# Patient Record
(demographics unavailable — no encounter records)

---

## 2024-10-27 NOTE — HISTORY OF PRESENT ILLNESS
[de-identified] : 10/23/2024: The patient is a 14 year old F, right hand dominant who presents today complaining of right knee pain. Date of Injury/Onset: 10/2/2024 Pain:    At Rest: 6/10 With Activity:  9/10 Mechanism of injury: patient was playing in a soccer game, when the opponent hit/pushed into her side leading patient to turn the right knee inward and fall. also landing on her left wrist This is not a Work Related Injury being treated under Worker's Compensation. This is an athletic injury occurring associated with an interscholastic or organized sports team. Quality of symptoms: throbbing medial knee pain, constant  Improves with: res, ice, NSAIDs Worse with: EB, walking, full knee extension, knee flexion  Prior treatment: [facility] 10/20/2024 - XR, MRI Prior imaging: XR 10/20/2024, MRI 10/21/2024 Previous injury: None Out of work/sport: Yes, since date of injury School/Sport/Position/Occupation: [employer], 9th - soccer, dance  Additional Information: None

## 2024-10-27 NOTE — HISTORY OF PRESENT ILLNESS
[de-identified] : 10/23/2024: The patient is a 14 year old F, right hand dominant who presents today complaining of right knee pain. Date of Injury/Onset: 10/2/2024 Pain:    At Rest: 6/10 With Activity:  9/10 Mechanism of injury: patient was playing in a soccer game, when the opponent hit/pushed into her side leading patient to turn the right knee inward and fall. also landing on her left wrist This is not a Work Related Injury being treated under Worker's Compensation. This is an athletic injury occurring associated with an interscholastic or organized sports team. Quality of symptoms: throbbing medial knee pain, constant  Improves with: res, ice, NSAIDs Worse with: EB, walking, full knee extension, knee flexion  Prior treatment: [facility] 10/20/2024 - XR, MRI Prior imaging: XR 10/20/2024, MRI 10/21/2024 Previous injury: None Out of work/sport: Yes, since date of injury School/Sport/Position/Occupation: [employer], 9th - soccer, dance  Additional Information: None

## 2024-10-27 NOTE — IMAGING
[de-identified] : RIGHT KNEE Inspection: moderate effusion Palpation: med facet of patella tenderness, medial retinacular tenderness Knee Range of Motion: 5-120 Strength: 5/5 Quadriceps strength, 5/5 Hamstring strength Neurological: light touch is intact throughout Ligament Stability and Special Tests:  McMurrays: neg Lachman: neg Pivot Shift: neg Posterior Drawer: neg Valgus: neg Varus: neg Patella Apprehension: positive Patella Maltracking: positive  LEFT WRIST and HAND Inspection: No erythema, No swelling  Palpation: No Tenderness  Range of Motion: Full range of motion. Strength: normal Neurological testing: motor exam 5/5 and light touch intact.  Special Testing:

## 2024-10-27 NOTE — IMAGING
[de-identified] : RIGHT KNEE Inspection: moderate effusion Palpation: med facet of patella tenderness, medial retinacular tenderness Knee Range of Motion: 5-120 Strength: 5/5 Quadriceps strength, 5/5 Hamstring strength Neurological: light touch is intact throughout Ligament Stability and Special Tests:  McMurrays: neg Lachman: neg Pivot Shift: neg Posterior Drawer: neg Valgus: neg Varus: neg Patella Apprehension: positive Patella Maltracking: positive  LEFT WRIST and HAND Inspection: No erythema, No swelling  Palpation: No Tenderness  Range of Motion: Full range of motion. Strength: normal Neurological testing: motor exam 5/5 and light touch intact.  Special Testing:

## 2024-10-27 NOTE — IMAGING
[de-identified] : RIGHT KNEE Inspection: moderate effusion Palpation: med facet of patella tenderness, medial retinacular tenderness Knee Range of Motion: 5-120 Strength: 5/5 Quadriceps strength, 5/5 Hamstring strength Neurological: light touch is intact throughout Ligament Stability and Special Tests:  McMurrays: neg Lachman: neg Pivot Shift: neg Posterior Drawer: neg Valgus: neg Varus: neg Patella Apprehension: positive Patella Maltracking: positive  LEFT WRIST and HAND Inspection: No erythema, No swelling  Palpation: No Tenderness  Range of Motion: Full range of motion. Strength: normal Neurological testing: motor exam 5/5 and light touch intact.  Special Testing:

## 2024-10-27 NOTE — HISTORY OF PRESENT ILLNESS
[de-identified] : 10/23/2024: The patient is a 14 year old F, right hand dominant who presents today complaining of right knee pain. Date of Injury/Onset: 10/2/2024 Pain:    At Rest: 6/10 With Activity:  9/10 Mechanism of injury: patient was playing in a soccer game, when the opponent hit/pushed into her side leading patient to turn the right knee inward and fall. also landing on her left wrist This is not a Work Related Injury being treated under Worker's Compensation. This is an athletic injury occurring associated with an interscholastic or organized sports team. Quality of symptoms: throbbing medial knee pain, constant  Improves with: res, ice, NSAIDs Worse with: EB, walking, full knee extension, knee flexion  Prior treatment: [facility] 10/20/2024 - XR, MRI Prior imaging: XR 10/20/2024, MRI 10/21/2024 Previous injury: None Out of work/sport: Yes, since date of injury School/Sport/Position/Occupation: [employer], 9th - soccer, dance  Additional Information: None

## 2024-10-27 NOTE — DISCUSSION/SUMMARY
[de-identified] : Assessment: The patient is a 14 year old female with physical exam findings consistent with [ RIGHT knee patella instability, and osteochondral defect and Wrist Sprain].   Patient, Mom, and I discussed their symptoms. Discussed findings of today's exam and possible causes of patient's pain. Educated patient on their most probable diagnosis. Reviewed possible courses of treatment, and we collaboratively decided best course of treatment at this time will include:   Right Knee: 1. Discussed dx and treatment options at length. Discussed surgical and non-surgical treatments, r/b/a, recovery and outcomes to each were discussed. Patient wants to move forward with surgical treatment at this time.   2. Icing and OTC anti-inflammatories as needed. 3. Patient out of sports/gym. 4. Advised patient to wear brace on knee.  Right Wrist: 1. Patient expressed pain in wrist, mom concerned due to needing crutches after surgery. 2. Icing and OTC anti-inflammatories as needed.     Follow up in [ sx for knee and as needed for wrist ]  Instructions: Dx / Natural History The patient was advised of the diagnosis.  The natural history of the pathology was explained in full to the patient in layman's terms.  Several different treatment options were discussed and explained in full to the patient including the risks and benefits of both surgical and non-surgical treatments.  All questions and concerns were answered.   RICE I explained to the patient that rest, ice, compression, and elevation would benefit them.  They may return to activity after follow-up or when they no longer have any pain.   NSAIDs - OTC Patient is to begin over the counter oral anti-inflammatory medications on an as needed basis, as long as there are no medical contraindications.  Patient is counseled on possible GI and blood pressure side effects.   Pain Guide Activities The patient was advised to let pain guide the gradual advancement of activities.   Icing The patient was advised to apply ice (wrapped in a towel or protective covering) to the area daily (20 minutes at a time, 2-4X/day).   All of the patient's questions were answered to Her satisfaction. Diagnoses and potential treatments were reviewed. She agreed with the plan and would like to move forward with it.  Consent:  Conservative treatment, nontreatment, nonsurgical intervention and surgical intervention treatment options have been reviewed with the patient.  The patient continues to be symptomatic and has failed conservative treatment, and elects to move forward with surgical intervention.  The patient is indicated for [the above procedure] and all indicated procedures. As such the alternatives, benefits and risks, of the above procedure, including but not limited to bleeding, infection, neurovascular injury, loss of limb, loss of life,  DVT, PE, RSD, inability to return to previous level of activity, inability to return to previous level of employment, advancement of or to osteoarthritic changes, joint instability or motion loss, hardware failure or migration, malunion or nonunion, failure to resolve all symptoms, failure to return to sports and need for further procedures were discussed with the patient and/or their legal guardian who agreed to move forward with surgical intervention.  They have reviewed and signed the consent form today after expressing understanding of the above documented conversation. The patient or their representative will contact my office as instructed on the preoperative instruction sheet they received today to schedule surgery in a timely manner as discussed.

## 2024-10-27 NOTE — DISCUSSION/SUMMARY
[de-identified] : Assessment: The patient is a 14 year old female with physical exam findings consistent with [ RIGHT knee patella instability, and osteochondral defect and Wrist Sprain].   Patient, Mom, and I discussed their symptoms. Discussed findings of today's exam and possible causes of patient's pain. Educated patient on their most probable diagnosis. Reviewed possible courses of treatment, and we collaboratively decided best course of treatment at this time will include:   Right Knee: 1. Discussed dx and treatment options at length. Discussed surgical and non-surgical treatments, r/b/a, recovery and outcomes to each were discussed. Patient wants to move forward with surgical treatment at this time.   2. Icing and OTC anti-inflammatories as needed. 3. Patient out of sports/gym. 4. Advised patient to wear brace on knee.  Right Wrist: 1. Patient expressed pain in wrist, mom concerned due to needing crutches after surgery. 2. Icing and OTC anti-inflammatories as needed.     Follow up in [ sx for knee and as needed for wrist ]  Instructions: Dx / Natural History The patient was advised of the diagnosis.  The natural history of the pathology was explained in full to the patient in layman's terms.  Several different treatment options were discussed and explained in full to the patient including the risks and benefits of both surgical and non-surgical treatments.  All questions and concerns were answered.   RICE I explained to the patient that rest, ice, compression, and elevation would benefit them.  They may return to activity after follow-up or when they no longer have any pain.   NSAIDs - OTC Patient is to begin over the counter oral anti-inflammatory medications on an as needed basis, as long as there are no medical contraindications.  Patient is counseled on possible GI and blood pressure side effects.   Pain Guide Activities The patient was advised to let pain guide the gradual advancement of activities.   Icing The patient was advised to apply ice (wrapped in a towel or protective covering) to the area daily (20 minutes at a time, 2-4X/day).   All of the patient's questions were answered to Her satisfaction. Diagnoses and potential treatments were reviewed. She agreed with the plan and would like to move forward with it.  Consent:  Conservative treatment, nontreatment, nonsurgical intervention and surgical intervention treatment options have been reviewed with the patient.  The patient continues to be symptomatic and has failed conservative treatment, and elects to move forward with surgical intervention.  The patient is indicated for [the above procedure] and all indicated procedures. As such the alternatives, benefits and risks, of the above procedure, including but not limited to bleeding, infection, neurovascular injury, loss of limb, loss of life,  DVT, PE, RSD, inability to return to previous level of activity, inability to return to previous level of employment, advancement of or to osteoarthritic changes, joint instability or motion loss, hardware failure or migration, malunion or nonunion, failure to resolve all symptoms, failure to return to sports and need for further procedures were discussed with the patient and/or their legal guardian who agreed to move forward with surgical intervention.  They have reviewed and signed the consent form today after expressing understanding of the above documented conversation. The patient or their representative will contact my office as instructed on the preoperative instruction sheet they received today to schedule surgery in a timely manner as discussed.

## 2024-11-18 NOTE — IMAGING
[de-identified] : RIGHT KNEE   Inspection: mild effusion, incisions c/d/i Palpation: medial facet ttp Knee Range of Motion: 0-120 Strength: 4/5 Quadriceps strength, 5/5 Hamstring strength Neurological: light touch is intact throughout Ligament Stability and Special Tests: McMurrays: neg Lachman: neg Pivot Shift: neg Posterior Drawer: neg Valgus: neg Varus: neg Patella Apprehension: neg Patella Maltracking: neg

## 2024-11-18 NOTE — DISCUSSION/SUMMARY
[de-identified] : The patient is approximately 2 weeks postoperative. s/p right knee ORIF femoral condyle osteochondral fragment, MPFL reconstruction w/ semitendinosus allograft, lateral release, VMO advancement, and medial capsular repair DOS: 11/5/2024  Sutures removed and Steri Strips applied today. The patient is instructed in wound management. The patient's post-op plan, protocol and activity modifications have been thoroughly discussed and the patient expressed understanding. The patient will control pain as discussed & continue ice and elevation as needed. The patient otherwise may advance activity as discussed.   Arthroscopy photos will be reviewed at next visit.     Prescription Medications Ordered: [None]   Physical Therapy: [Start per protocol, new prescription given today, continue home exercise program]   Braces/DME Ordered: [Continue Postop Brace]   Activity/Work/Sports Status: [Out of work/gym/sports]   Follow-Up: [4 weeks]

## 2024-11-18 NOTE — HISTORY OF PRESENT ILLNESS
[de-identified] : 11/18/2024 : patient present today for POV #1 s/p right knee ORIF femoral condyle osteochondral fragment, MPFL reconstruction w/ semitendinosus allograft, lateral release, VMO advancement, and medial capsular repair DOS: 11/5/2024 Pain:     At Rest: 1/10 With Activity: 7/10 Quality Of Symptoms: aching pain general knee Since last visit: pt has been compliant w/ knee immobilizer and has been NWB w/ crutches  10/23/2024: The patient is a 14 year old F, right hand dominant who presents today complaining of right knee pain. Date of Injury/Onset: 10/2/2024 Pain:    At Rest: 6/10 With Activity:  9/10 Mechanism of injury: patient was playing in a soccer game, when the opponent hit/pushed into her side leading patient to turn the right knee inward and fall. also landing on her left wrist This is not a Work Related Injury being treated under Worker's Compensation. This is an athletic injury occurring associated with an interscholastic or organized sports team. Quality of symptoms: throbbing medial knee pain, constant  Improves with: res, ice, NSAIDs Worse with: EB, walking, full knee extension, knee flexion  Prior treatment: [facility] 10/20/2024 - XR, MRI Prior imaging: XR 10/20/2024, MRI 10/21/2024 Previous injury: None Out of work/sport: Yes, since date of injury School/Sport/Position/Occupation: [employer], 9th - soccer, dance  Additional Information: None
Bed/Stretcher in lowest position, wheels locked, appropriate side rails in place/Call bell, personal items and telephone in reach/Instruct patient to call for assistance before getting out of bed/chair/stretcher/Non-slip footwear applied when patient is off stretcher/[place] to call system/Physically safe environment - no spills, clutter or unnecessary equipment/Purposeful proactive rounding/Room/bathroom lighting operational, light cord in reach

## 2024-11-18 NOTE — REASON FOR VISIT
[Parent] : parent [FreeTextEntry2] : POV#1 s/p right knee ORIF femoral condyle osteochondral fragment, MPFL reconstruction w/ semitendinosus allograft, lateral release, VMO advancement, and medial capsular repair DOS: 11/5/2024

## 2024-12-11 NOTE — IMAGING
[de-identified] : RIGHT KNEE Inspection: mild effusion, incisions are dry, small scab at midportion of incision, no active drainage and no sign of infection Palpation: medial facet ttp Knee Range of Motion: 0-60 Strength: 4/5 Quadriceps strength, 5/5 Hamstring strength Neurological: light touch is intact throughout Ligament Stability and Special Tests: McMurrays: neg Lachman: neg Pivot Shift: neg Posterior Drawer: neg Valgus: neg Varus: neg Patella Apprehension: neg Patella Maltracking: neg

## 2024-12-11 NOTE — REASON FOR VISIT
[Parent] : parent [FreeTextEntry2] : POV#2 s/p right knee ORIF femoral condyle osteochondral fragment, MPFL reconstruction w/ semitendinosus allograft, lateral release, VMO advancement, and medial capsular repair DOS: 11/5/2024

## 2024-12-11 NOTE — DISCUSSION/SUMMARY
[de-identified] : PT in post op protocol - s/p right knee ORIF femoral condyle osteochondral fragment, MPFL reconstruction w/ semitendinosus allograft, lateral release, VMO advancement, and medial capsular repair DOS: 11/5/2024   - Continue Physical Therapy - Home exercises program learned at physical therapy. - Finish ABX - The patient was advised to apply ice (wrapped in a towel or protective covering) to the area daily (20 minutes at a time, 2-4X/day).    Follow up: on 12/23

## 2024-12-11 NOTE — HISTORY OF PRESENT ILLNESS
[de-identified] : 12/11/2024: 12/11/2024 : patient present today for POV #2 s/p right knee ORIF femoral condyle osteochondral fragment, MPFL reconstruction w/ semitendinosus allograft, lateral release, VMO advancement, and medial capsular repair DOS: 11/05/2024 Pain:     At Rest: 1/10 With Activity: 4/10 Quality Of Symptoms: aching pain  Since last visit: pt has been compliant w/ knee immobilizer and has been NWB w/ crutches.  Pt is in PT 2x/week.  Not taking anything for pain.  Occasional mild itchiness at incision site.  Taking antibiotics for one week.    11/18/2024 : patient present today for POV #1 s/p right knee ORIF femoral condyle osteochondral fragment, MPFL reconstruction w/ semitendinosus allograft, lateral release, VMO advancement, and medial capsular repair DOS: 11/5/2024 Pain:     At Rest: 1/10 With Activity: 7/10 Quality Of Symptoms: aching pain general knee Since last visit: pt has been compliant w/ knee immobilizer and has been NWB w/ crutches  10/23/2024: The patient is a 14 year old F, right hand dominant who presents today complaining of right knee pain. Date of Injury/Onset: 10/2/2024 Pain:    At Rest: 6/10 With Activity:  9/10 Mechanism of injury: patient was playing in a soccer game, when the opponent hit/pushed into her side leading patient to turn the right knee inward and fall. also landing on her left wrist This is not a Work Related Injury being treated under Worker's Compensation. This is an athletic injury occurring associated with an interscholastic or organized sports team. Quality of symptoms: throbbing medial knee pain, constant  Improves with: res, ice, NSAIDs Worse with: EB, walking, full knee extension, knee flexion  Prior treatment: [facility] 10/20/2024 - XR, MRI Prior imaging: XR 10/20/2024, MRI 10/21/2024 Previous injury: None Out of work/sport: Yes, since date of injury School/Sport/Position/Occupation: [employer], 9th - soccer, dance  Additional Information: None

## 2024-12-23 NOTE — HISTORY OF PRESENT ILLNESS
[de-identified] : 12/23/2024 : patient present today for POV #3 s/p right knee ORIF femoral condyle osteochondral fragment, MPFL reconstruction w/ semitendinosus allograft, lateral release, VMO advancement, and medial capsular repair DOS: 11/5/2024 Pain:     At Rest: 0/10 With Activity: 3/10 Quality Of Symptoms: Soreness  Since last visit: Attending physical theray 2x a week at [facility] in [place].  12/11/2024: 12/11/2024 : patient present today for POV #2 s/p right knee ORIF femoral condyle osteochondral fragment, MPFL reconstruction w/ semitendinosus allograft, lateral release, VMO advancement, and medial capsular repair DOS: 11/05/2024 Pain:     At Rest: 1/10 With Activity: 4/10 Quality Of Symptoms: aching pain  Since last visit: pt has been compliant w/ knee immobilizer and has been NWB w/ crutches.  Pt is in PT 2x/week.  Not taking anything for pain.  Occasional mild itchiness at incision site.  Taking antibiotics for one week.    11/18/2024 : patient present today for POV #1 s/p right knee ORIF femoral condyle osteochondral fragment, MPFL reconstruction w/ semitendinosus allograft, lateral release, VMO advancement, and medial capsular repair DOS: 11/5/2024 Pain:     At Rest: 1/10 With Activity: 7/10 Quality Of Symptoms: aching pain general knee Since last visit: pt has been compliant w/ knee immobilizer and has been NWB w/ crutches  10/23/2024: The patient is a 14 year old F, right hand dominant who presents today complaining of right knee pain. Date of Injury/Onset: 10/2/2024 Pain:    At Rest: 6/10 With Activity:  9/10 Mechanism of injury: patient was playing in a soccer game, when the opponent hit/pushed into her side leading patient to turn the right knee inward and fall. also landing on her left wrist This is not a Work Related Injury being treated under Worker's Compensation. This is an athletic injury occurring associated with an interscholastic or organized sports team. Quality of symptoms: throbbing medial knee pain, constant  Improves with: res, ice, NSAIDs Worse with: EB, walking, full knee extension, knee flexion  Prior treatment: [facility] 10/20/2024 - XR, MRI Prior imaging: XR 10/20/2024, MRI 10/21/2024 Previous injury: None Out of work/sport: Yes, since date of injury School/Sport/Position/Occupation: [employer] , 9th - soccer, dance  Additional Information: None

## 2024-12-23 NOTE — REASON FOR VISIT
[Parent] : parent [FreeTextEntry2] : POV#3 s/p right knee ORIF femoral condyle osteochondral fragment, MPFL reconstruction w/ semitendinosus allograft, lateral release, VMO advancement, and medial capsular repair DOS: 11/5/2024

## 2024-12-23 NOTE — IMAGING
[de-identified] : RIGHT KNEE Inspection: mild effusion, incisions are dry, small scab at midportion of incision, no active drainage and no sign of infection Palpation: medial facet ttp Knee Range of Motion: 0-70 Strength: 4/5 Quadriceps strength, 5/5 Hamstring strength Neurological: light touch is intact throughout Ligament Stability and Special Tests: McMurrays: neg Lachman: neg Pivot Shift: neg Posterior Drawer: neg Valgus: neg Varus: neg Patella Apprehension: neg Patella Maltracking: neg

## 2024-12-23 NOTE — DISCUSSION/SUMMARY
[de-identified] : PT in post op protocol - s/p right knee ORIF femoral condyle osteochondral fragment, MPFL reconstruction w/ semitendinosus allograft, lateral release, VMO advancement, and medial capsular repair DOS: 11/5/2024   - The patient was provided with a prescription for Physical Therapy - Home exercises program learned at physical therapy. - The patient was advised to apply ice (wrapped in a towel or protective covering) to the area daily (20 minutes at a time, 2-4X/day). - Continue brace    Follow up: 6 weeks

## 2025-02-03 NOTE — DISCUSSION/SUMMARY
[de-identified] : PT in post op protocol - s/p right knee ORIF femoral condyle osteochondral fragment, MPFL reconstruction w/ semitendinosus allograft, lateral release, VMO advancement, and medial capsular repair DOS: 11/5/2024   - The patient was provided with a prescription for Physical Therapy - Home exercises program learned at physical therapy. - The patient was advised to apply ice (wrapped in a towel or protective covering) to the area daily (20 minutes at a time, 2-4X/day). - Discussed putting Vitamin E and cocoa butter on scar as needed - D/c brace. - Discussed compression sleeve or functional brace for when patient returns to sports.   Follow up: 3 months

## 2025-02-03 NOTE — HISTORY OF PRESENT ILLNESS
[de-identified] : 02/03/2025: Patient is 3 months s/p right knee ORIF femoral condyle osteochondral fragment, MPFL reconstruction w/ semitendinosus allograft, lateral release, VMO advancement, and medial capsular repair (DOS: 11/5/24). Pain and symptoms are better. Since last visit pt continues to attend physical therapy 2-3x/week with significant improvement.   12/23/2024 : patient present today for POV #3 s/p right knee ORIF femoral condyle osteochondral fragment, MPFL reconstruction w/ semitendinosus allograft, lateral release, VMO advancement, and medial capsular repair DOS: 11/5/2024 Pain:     At Rest: 0/10 With Activity: 3/10 Quality Of Symptoms: Soreness  Since last visit: Attending physical theray 2x a week at [facility] in [place].  12/11/2024: 12/11/2024 : patient present today for POV #2 s/p right knee ORIF femoral condyle osteochondral fragment, MPFL reconstruction w/ semitendinosus allograft, lateral release, VMO advancement, and medial capsular repair DOS: 11/05/2024 Pain:     At Rest: 1/10 With Activity: 4/10 Quality Of Symptoms: aching pain  Since last visit: pt has been compliant w/ knee immobilizer and has been NWB w/ crutches.  Pt is in PT 2x/week.  Not taking anything for pain.  Occasional mild itchiness at incision site.  Taking antibiotics for one week.    11/18/2024 : patient present today for POV #1 s/p right knee ORIF femoral condyle osteochondral fragment, MPFL reconstruction w/ semitendinosus allograft, lateral release, VMO advancement, and medial capsular repair DOS: 11/5/2024 Pain:     At Rest: 1/10 With Activity: 7/10 Quality Of Symptoms: aching pain general knee Since last visit: pt has been compliant w/ knee immobilizer and has been NWB w/ crutches  10/23/2024: The patient is a 14 year old F, right hand dominant who presents today complaining of right knee pain. Date of Injury/Onset: 10/2/2024 Pain:    At Rest: 6/10 With Activity:  9/10 Mechanism of injury: patient was playing in a soccer game, when the opponent hit/pushed into her side leading patient to turn the right knee inward and fall. also landing on her left wrist This is not a Work Related Injury being treated under Worker's Compensation. This is an athletic injury occurring associated with an interscholastic or organized sports team. Quality of symptoms: throbbing medial knee pain, constant  Improves with: res, ice, NSAIDs Worse with: EB, walking, full knee extension, knee flexion  Prior treatment: [facility] 10/20/2024 - XR, MRI Prior imaging: XR 10/20/2024, MRI 10/21/2024 Previous injury: None Out of work/sport: Yes, since date of injury School/Sport/Position/Occupation: [employer], 9th - soccer, dance  Additional Information: None

## 2025-02-03 NOTE — IMAGING
[de-identified] : RIGHT KNEE Inspection: mild effusion, incisions are well healed Palpation: medial facet ttp Knee Range of Motion: 0-120 Strength: 4/5 Quadriceps strength, 5/5 Hamstring strength Neurological: light touch is intact throughout Ligament Stability and Special Tests: McMurrays: neg Lachman: neg Pivot Shift: neg Posterior Drawer: neg Valgus: neg Varus: neg Patella Apprehension: neg Patella Maltracking: neg

## 2025-02-03 NOTE — REASON FOR VISIT
[Parent] : parent [FreeTextEntry2] :  s/p right knee ORIF femoral condyle osteochondral fragment, MPFL reconstruction w/ semitendinosus allograft, lateral release, VMO advancement, and medial capsular repair DOS: 11/5/2024

## 2025-05-01 NOTE — HISTORY OF PRESENT ILLNESS
[de-identified] : 04/28/2025: Pt here today for follow-up s/p right knee ORIF femoral condyle osteochondral fragment, MPFL reconstruction w/ semitendinosus allograft, lateral release, VMO advancement, and medial capsular repair (DOS: 11/5/24). Pain and symptoms are improving. Since last visit pt has continued PT, going 2x/wk @ [facility] [place].   02/03/2025: Patient is 3 months s/p right knee ORIF femoral condyle osteochondral fragment, MPFL reconstruction w/ semitendinosus allograft, lateral release, VMO advancement, and medial capsular repair (DOS: 11/5/24). Pain and symptoms are better. Since last visit pt continues to attend physical therapy 2-3x/week with significant improvement.   12/23/2024 : patient present today for POV #3 s/p right knee ORIF femoral condyle osteochondral fragment, MPFL reconstruction w/ semitendinosus allograft, lateral release, VMO advancement, and medial capsular repair DOS: 11/5/2024 Pain:     At Rest: 0/10 With Activity: 3/10 Quality Of Symptoms: Soreness  Since last visit: Attending physical theray 2x a week at [facility] in [place].  12/11/2024: 12/11/2024 : patient present today for POV #2 s/p right knee ORIF femoral condyle osteochondral fragment, MPFL reconstruction w/ semitendinosus allograft, lateral release, VMO advancement, and medial capsular repair DOS: 11/05/2024 Pain:     At Rest: 1/10 With Activity: 4/10 Quality Of Symptoms: aching pain  Since last visit: pt has been compliant w/ knee immobilizer and has been NWB w/ crutches.  Pt is in PT 2x/week.  Not taking anything for pain.  Occasional mild itchiness at incision site.  Taking antibiotics for one week.    11/18/2024 : patient present today for POV #1 s/p right knee ORIF femoral condyle osteochondral fragment, MPFL reconstruction w/ semitendinosus allograft, lateral release, VMO advancement, and medial capsular repair DOS: 11/5/2024 Pain:     At Rest: 1/10 With Activity: 7/10 Quality Of Symptoms: aching pain general knee Since last visit: pt has been compliant w/ knee immobilizer and has been NWB w/ crutches  10/23/2024: The patient is a 14 year old F, right hand dominant who presents today complaining of right knee pain. Date of Injury/Onset: 10/2/2024 Pain:    At Rest: 6/10 With Activity:  9/10 Mechanism of injury: patient was playing in a soccer game, when the opponent hit/pushed into her side leading patient to turn the right knee inward and fall. also landing on her left wrist This is not a Work Related Injury being treated under Worker's Compensation. This is an athletic injury occurring associated with an interscholastic or organized sports team. Quality of symptoms: throbbing medial knee pain, constant  Improves with: res, ice, NSAIDs Worse with: EB, walking, full knee extension, knee flexion  Prior treatment: [facility] 10/20/2024 - XR, MRI Prior imaging: XR 10/20/2024, MRI 10/21/2024 Previous injury: None Out of work/sport: Yes, since date of injury School/Sport/Position/Occupation: [employer], 9th - soccer, dance  Additional Information: None

## 2025-05-01 NOTE — DISCUSSION/SUMMARY
[de-identified] : PT in post op protocol - s/p right knee ORIF femoral condyle osteochondral fragment, MPFL reconstruction w/ semitendinosus allograft, lateral release, VMO advancement, and medial capsular repair DOS: 11/5/2024   - The patient was provided with a prescription for Physical Therapy - Home exercises program learned at physical therapy.  Follow up: 3 months

## 2025-05-01 NOTE — IMAGING
[de-identified] : RIGHT KNEE Inspection: well healed surg scars Palpation: no TTP Knee Range of Motion: 0-120 Strength: 5/5 Quadriceps strength, 5/5 Hamstring strength Neurological: light touch is intact throughout Ligament Stability and Special Tests: McMurrays: neg Lachman: neg Pivot Shift: neg Posterior Drawer: neg Valgus: neg Varus: neg Patella Apprehension: neg Patella Maltracking: neg

## 2025-05-01 NOTE — IMAGING
[de-identified] : RIGHT KNEE Inspection: well healed surg scars Palpation: no TTP Knee Range of Motion: 0-120 Strength: 5/5 Quadriceps strength, 5/5 Hamstring strength Neurological: light touch is intact throughout Ligament Stability and Special Tests: McMurrays: neg Lachman: neg Pivot Shift: neg Posterior Drawer: neg Valgus: neg Varus: neg Patella Apprehension: neg Patella Maltracking: neg

## 2025-05-01 NOTE — REASON FOR VISIT
[Parent] : parent [FreeTextEntry2] : Follow up: s/p right knee ORIF femoral condyle osteochondral fragment, MPFL reconstruction w/ semitendinosus allograft, lateral release, VMO advancement, and medial capsular repair DOS: 11/5/2024

## 2025-05-01 NOTE — HISTORY OF PRESENT ILLNESS
[de-identified] : 04/28/2025: Pt here today for follow-up s/p right knee ORIF femoral condyle osteochondral fragment, MPFL reconstruction w/ semitendinosus allograft, lateral release, VMO advancement, and medial capsular repair (DOS: 11/5/24). Pain and symptoms are improving. Since last visit pt has continued PT, going 2x/wk @ [facility] [place].   02/03/2025: Patient is 3 months s/p right knee ORIF femoral condyle osteochondral fragment, MPFL reconstruction w/ semitendinosus allograft, lateral release, VMO advancement, and medial capsular repair (DOS: 11/5/24). Pain and symptoms are better. Since last visit pt continues to attend physical therapy 2-3x/week with significant improvement.   12/23/2024 : patient present today for POV #3 s/p right knee ORIF femoral condyle osteochondral fragment, MPFL reconstruction w/ semitendinosus allograft, lateral release, VMO advancement, and medial capsular repair DOS: 11/5/2024 Pain:     At Rest: 0/10 With Activity: 3/10 Quality Of Symptoms: Soreness  Since last visit: Attending physical theray 2x a week at [facility] in [place].  12/11/2024: 12/11/2024 : patient present today for POV #2 s/p right knee ORIF femoral condyle osteochondral fragment, MPFL reconstruction w/ semitendinosus allograft, lateral release, VMO advancement, and medial capsular repair DOS: 11/05/2024 Pain:     At Rest: 1/10 With Activity: 4/10 Quality Of Symptoms: aching pain  Since last visit: pt has been compliant w/ knee immobilizer and has been NWB w/ crutches.  Pt is in PT 2x/week.  Not taking anything for pain.  Occasional mild itchiness at incision site.  Taking antibiotics for one week.    11/18/2024 : patient present today for POV #1 s/p right knee ORIF femoral condyle osteochondral fragment, MPFL reconstruction w/ semitendinosus allograft, lateral release, VMO advancement, and medial capsular repair DOS: 11/5/2024 Pain:     At Rest: 1/10 With Activity: 7/10 Quality Of Symptoms: aching pain general knee Since last visit: pt has been compliant w/ knee immobilizer and has been NWB w/ crutches  10/23/2024: The patient is a 14 year old F, right hand dominant who presents today complaining of right knee pain. Date of Injury/Onset: 10/2/2024 Pain:    At Rest: 6/10 With Activity:  9/10 Mechanism of injury: patient was playing in a soccer game, when the opponent hit/pushed into her side leading patient to turn the right knee inward and fall. also landing on her left wrist This is not a Work Related Injury being treated under Worker's Compensation. This is an athletic injury occurring associated with an interscholastic or organized sports team. Quality of symptoms: throbbing medial knee pain, constant  Improves with: res, ice, NSAIDs Worse with: EB, walking, full knee extension, knee flexion  Prior treatment: [facility] 10/20/2024 - XR, MRI Prior imaging: XR 10/20/2024, MRI 10/21/2024 Previous injury: None Out of work/sport: Yes, since date of injury School/Sport/Position/Occupation: [employer], 9th - soccer, dance  Additional Information: None

## 2025-05-01 NOTE — IMAGING
[de-identified] : RIGHT KNEE Inspection: well healed surg scars Palpation: no TTP Knee Range of Motion: 0-120 Strength: 5/5 Quadriceps strength, 5/5 Hamstring strength Neurological: light touch is intact throughout Ligament Stability and Special Tests: McMurrays: neg Lachman: neg Pivot Shift: neg Posterior Drawer: neg Valgus: neg Varus: neg Patella Apprehension: neg Patella Maltracking: neg

## 2025-05-01 NOTE — DISCUSSION/SUMMARY
[de-identified] : PT in post op protocol - s/p right knee ORIF femoral condyle osteochondral fragment, MPFL reconstruction w/ semitendinosus allograft, lateral release, VMO advancement, and medial capsular repair DOS: 11/5/2024   - The patient was provided with a prescription for Physical Therapy - Home exercises program learned at physical therapy.  Follow up: 3 months

## 2025-05-01 NOTE — DISCUSSION/SUMMARY
[de-identified] : PT in post op protocol - s/p right knee ORIF femoral condyle osteochondral fragment, MPFL reconstruction w/ semitendinosus allograft, lateral release, VMO advancement, and medial capsular repair DOS: 11/5/2024   - The patient was provided with a prescription for Physical Therapy - Home exercises program learned at physical therapy.  Follow up: 3 months

## 2025-05-01 NOTE — HISTORY OF PRESENT ILLNESS
[de-identified] : 04/28/2025: Pt here today for follow-up s/p right knee ORIF femoral condyle osteochondral fragment, MPFL reconstruction w/ semitendinosus allograft, lateral release, VMO advancement, and medial capsular repair (DOS: 11/5/24). Pain and symptoms are improving. Since last visit pt has continued PT, going 2x/wk @ [facility] [place].   02/03/2025: Patient is 3 months s/p right knee ORIF femoral condyle osteochondral fragment, MPFL reconstruction w/ semitendinosus allograft, lateral release, VMO advancement, and medial capsular repair (DOS: 11/5/24). Pain and symptoms are better. Since last visit pt continues to attend physical therapy 2-3x/week with significant improvement.   12/23/2024 : patient present today for POV #3 s/p right knee ORIF femoral condyle osteochondral fragment, MPFL reconstruction w/ semitendinosus allograft, lateral release, VMO advancement, and medial capsular repair DOS: 11/5/2024 Pain:     At Rest: 0/10 With Activity: 3/10 Quality Of Symptoms: Soreness  Since last visit: Attending physical theray 2x a week at [facility] in [place].  12/11/2024: 12/11/2024 : patient present today for POV #2 s/p right knee ORIF femoral condyle osteochondral fragment, MPFL reconstruction w/ semitendinosus allograft, lateral release, VMO advancement, and medial capsular repair DOS: 11/05/2024 Pain:     At Rest: 1/10 With Activity: 4/10 Quality Of Symptoms: aching pain  Since last visit: pt has been compliant w/ knee immobilizer and has been NWB w/ crutches.  Pt is in PT 2x/week.  Not taking anything for pain.  Occasional mild itchiness at incision site.  Taking antibiotics for one week.    11/18/2024 : patient present today for POV #1 s/p right knee ORIF femoral condyle osteochondral fragment, MPFL reconstruction w/ semitendinosus allograft, lateral release, VMO advancement, and medial capsular repair DOS: 11/5/2024 Pain:     At Rest: 1/10 With Activity: 7/10 Quality Of Symptoms: aching pain general knee Since last visit: pt has been compliant w/ knee immobilizer and has been NWB w/ crutches  10/23/2024: The patient is a 14 year old F, right hand dominant who presents today complaining of right knee pain. Date of Injury/Onset: 10/2/2024 Pain:    At Rest: 6/10 With Activity:  9/10 Mechanism of injury: patient was playing in a soccer game, when the opponent hit/pushed into her side leading patient to turn the right knee inward and fall. also landing on her left wrist This is not a Work Related Injury being treated under Worker's Compensation. This is an athletic injury occurring associated with an interscholastic or organized sports team. Quality of symptoms: throbbing medial knee pain, constant  Improves with: res, ice, NSAIDs Worse with: EB, walking, full knee extension, knee flexion  Prior treatment: [facility] 10/20/2024 - XR, MRI Prior imaging: XR 10/20/2024, MRI 10/21/2024 Previous injury: None Out of work/sport: Yes, since date of injury School/Sport/Position/Occupation: [employer], 9th - soccer, dance  Additional Information: None

## 2025-07-14 NOTE — DISCUSSION/SUMMARY
[de-identified] : Patient is a 14 year y/o female about 8 months s/p right knee ORIF femoral condyle osteochondral fragment, MPFL reconstruction w/ semitendinosus allograft, lateral release, VMO advancement, and medial capsular repair (DOS: 11/5/24)    The patient's post-op plan, protocol and activity modifications have been thoroughly discussed and the patient expressed understanding. The patient will control pain as discussed & continue ice and elevation as needed. The patient otherwise may advance activity as discussed.  - Incisions appear well healed. - The patient will continue home exercises program learned at physical therapy. - Patient may wean out of brace and transition to compression sleeve.  - Patient can return to sports, note provided.  Follow up: prn

## 2025-07-14 NOTE — HISTORY OF PRESENT ILLNESS
[de-identified] : 07/14/2025: Pt here today for follow-up s/p right knee ORIF femoral condyle osteochondral fragment, MPFL reconstruction w/ semitendinosus allograft, lateral release, VMO advancement, and medial capsular repair (DOS: 11/5/24). Pain and symptoms are improving. Since last visit pt has been doing PT 2x/wk @ [facility]. Today w/ no c/o pain. She has been running and practicing in the brace.   04/28/2025: Pt here today for follow-up s/p right knee ORIF femoral condyle osteochondral fragment, MPFL reconstruction w/ semitendinosus allograft, lateral release, VMO advancement, and medial capsular repair (DOS: 11/5/24). Pain and symptoms are improving. Since last visit pt has continued PT, going 2x/wk @ [facility].   02/03/2025: Patient is 3 months s/p right knee ORIF femoral condyle osteochondral fragment, MPFL reconstruction w/ semitendinosus allograft, lateral release, VMO advancement, and medial capsular repair (DOS: 11/5/24). Pain and symptoms are better. Since last visit pt continues to attend physical therapy 2-3x/week with significant improvement.   12/23/2024 : patient present today for POV #3 s/p right knee ORIF femoral condyle osteochondral fragment, MPFL reconstruction w/ semitendinosus allograft, lateral release, VMO advancement, and medial capsular repair DOS: 11/5/2024 Pain:     At Rest: 0/10 With Activity: 3/10 Quality Of Symptoms: Soreness  Since last visit: Attending physical theray 2x a week at [facility] in [place].  12/11/2024: 12/11/2024 : patient present today for POV #2 s/p right knee ORIF femoral condyle osteochondral fragment, MPFL reconstruction w/ semitendinosus allograft, lateral release, VMO advancement, and medial capsular repair DOS: 11/05/2024 Pain:     At Rest: 1/10 With Activity: 4/10 Quality Of Symptoms: aching pain  Since last visit: pt has been compliant w/ knee immobilizer and has been NWB w/ crutches.  Pt is in PT 2x/week.  Not taking anything for pain.  Occasional mild itchiness at incision site.  Taking antibiotics for one week.    11/18/2024 : patient present today for POV #1 s/p right knee ORIF femoral condyle osteochondral fragment, MPFL reconstruction w/ semitendinosus allograft, lateral release, VMO advancement, and medial capsular repair DOS: 11/5/2024 Pain:     At Rest: 1/10 With Activity: 7/10 Quality Of Symptoms: aching pain general knee Since last visit: pt has been compliant w/ knee immobilizer and has been NWB w/ crutches  10/23/2024: The patient is a 14 year old F, right hand dominant who presents today complaining of right knee pain. Date of Injury/Onset: 10/2/2024 Pain:    At Rest: 6/10 With Activity:  9/10 Mechanism of injury: patient was playing in a soccer game, when the opponent hit/pushed into her side leading patient to turn the right knee inward and fall. also landing on her left wrist This is not a Work Related Injury being treated under Worker's Compensation. This is an athletic injury occurring associated with an interscholastic or organized sports team. Quality of symptoms: throbbing medial knee pain, constant  Improves with: res, ice, NSAIDs Worse with: EB, walking, full knee extension, knee flexion  Prior treatment: [facility] 10/20/2024 - XR, MRI Prior imaging: XR 10/20/2024, MRI 10/21/2024 Previous injury: None Out of work/sport: Yes, since date of injury School/Sport/Position/Occupation: [employer], 9th - soccer, dance  Additional Information: None

## 2025-07-14 NOTE — IMAGING
[de-identified] : RIGHT KNEE Inspection: well healed surg scars Palpation: no TTP Knee Range of Motion: 0-120 Strength: 5/5 Quadriceps strength, 5/5 Hamstring strength Neurological: light touch is intact throughout Ligament Stability and Special Tests: McMurrays: neg Lachman: neg Pivot Shift: neg Posterior Drawer: neg Valgus: neg Varus: neg Patella Apprehension: neg Patella Maltracking: neg